# Patient Record
Sex: MALE | Race: ASIAN | Employment: FULL TIME | URBAN - METROPOLITAN AREA
[De-identification: names, ages, dates, MRNs, and addresses within clinical notes are randomized per-mention and may not be internally consistent; named-entity substitution may affect disease eponyms.]

---

## 2017-01-13 ENCOUNTER — CLINICAL SUPPORT (OUTPATIENT)
Dept: CARDIOLOGY | Facility: CLINIC | Age: 37
End: 2017-01-13
Payer: COMMERCIAL

## 2017-01-13 ENCOUNTER — OFFICE VISIT (OUTPATIENT)
Dept: INTERNAL MEDICINE | Facility: CLINIC | Age: 37
End: 2017-01-13
Payer: COMMERCIAL

## 2017-01-13 ENCOUNTER — CLINICAL SUPPORT (OUTPATIENT)
Dept: REHABILITATION | Facility: HOSPITAL | Age: 37
End: 2017-01-13
Attending: FAMILY MEDICINE
Payer: COMMERCIAL

## 2017-01-13 ENCOUNTER — CLINICAL SUPPORT (OUTPATIENT)
Dept: INTERNAL MEDICINE | Facility: CLINIC | Age: 37
End: 2017-01-13
Payer: COMMERCIAL

## 2017-01-13 VITALS
RESPIRATION RATE: 16 BRPM | BODY MASS INDEX: 24.87 KG/M2 | WEIGHT: 173.75 LBS | HEART RATE: 64 BPM | DIASTOLIC BLOOD PRESSURE: 54 MMHG | SYSTOLIC BLOOD PRESSURE: 100 MMHG | TEMPERATURE: 97 F | HEIGHT: 70 IN

## 2017-01-13 VITALS
DIASTOLIC BLOOD PRESSURE: 54 MMHG | SYSTOLIC BLOOD PRESSURE: 100 MMHG | WEIGHT: 173.75 LBS | BODY MASS INDEX: 24.87 KG/M2 | HEIGHT: 70 IN | HEART RATE: 64 BPM | RESPIRATION RATE: 16 BRPM

## 2017-01-13 DIAGNOSIS — D17.1 LIPOMA OF TORSO: ICD-10-CM

## 2017-01-13 DIAGNOSIS — Z00.00 ROUTINE GENERAL MEDICAL EXAMINATION AT A HEALTH CARE FACILITY: Primary | ICD-10-CM

## 2017-01-13 DIAGNOSIS — E78.6 LOW HDL (UNDER 40): ICD-10-CM

## 2017-01-13 DIAGNOSIS — Z00.00 ROUTINE GENERAL MEDICAL EXAMINATION AT A HEALTH CARE FACILITY: ICD-10-CM

## 2017-01-13 DIAGNOSIS — D64.89 ANEMIA DUE TO OTHER CAUSE, NOT CLASSIFIED: ICD-10-CM

## 2017-01-13 DIAGNOSIS — J30.2 SEASONAL ALLERGIC RHINITIS, UNSPECIFIED ALLERGIC RHINITIS TRIGGER: ICD-10-CM

## 2017-01-13 DIAGNOSIS — Z00.00 ANNUAL PHYSICAL EXAM: Primary | ICD-10-CM

## 2017-01-13 DIAGNOSIS — J45.20 MILD INTERMITTENT ASTHMA WITHOUT COMPLICATION: ICD-10-CM

## 2017-01-13 LAB
ALBUMIN SERPL BCP-MCNC: 3.8 G/DL
ALP SERPL-CCNC: 51 U/L
ALT SERPL W/O P-5'-P-CCNC: 19 U/L
ANION GAP SERPL CALC-SCNC: 10 MMOL/L
AST SERPL-CCNC: 16 U/L
BILIRUB SERPL-MCNC: 0.7 MG/DL
BUN SERPL-MCNC: 15 MG/DL
CALCIUM SERPL-MCNC: 9 MG/DL
CHLORIDE SERPL-SCNC: 108 MMOL/L
CHOLEST/HDLC SERPL: 6.5 {RATIO}
CO2 SERPL-SCNC: 22 MMOL/L
CREAT SERPL-MCNC: 0.9 MG/DL
ERYTHROCYTE [DISTWIDTH] IN BLOOD BY AUTOMATED COUNT: 12.3 %
EST. GFR  (AFRICAN AMERICAN): >60 ML/MIN/1.73 M^2
EST. GFR  (NON AFRICAN AMERICAN): >60 ML/MIN/1.73 M^2
GLUCOSE SERPL-MCNC: 92 MG/DL
HCT VFR BLD AUTO: 31.1 %
HDL/CHOLESTEROL RATIO: 15.5 %
HDLC SERPL-MCNC: 181 MG/DL
HDLC SERPL-MCNC: 28 MG/DL
HGB BLD-MCNC: 11 G/DL
LDLC SERPL CALC-MCNC: 115.8 MG/DL
MCH RBC QN AUTO: 30.6 PG
MCHC RBC AUTO-ENTMCNC: 35.4 %
MCV RBC AUTO: 86 FL
NONHDLC SERPL-MCNC: 153 MG/DL
PLATELET # BLD AUTO: 176 K/UL
PMV BLD AUTO: 11 FL
POTASSIUM SERPL-SCNC: 3.9 MMOL/L
PROT SERPL-MCNC: 6.7 G/DL
RBC # BLD AUTO: 3.6 M/UL
SODIUM SERPL-SCNC: 140 MMOL/L
TRIGL SERPL-MCNC: 186 MG/DL
WBC # BLD AUTO: 4.67 K/UL

## 2017-01-13 PROCEDURE — 93005 ELECTROCARDIOGRAM TRACING: CPT | Mod: PBBFAC,PO | Performed by: INTERNAL MEDICINE

## 2017-01-13 PROCEDURE — 99999 PR PBB SHADOW E&M-EST. PATIENT-LVL III: CPT | Mod: PBBFAC,,, | Performed by: FAMILY MEDICINE

## 2017-01-13 PROCEDURE — 85027 COMPLETE CBC AUTOMATED: CPT | Mod: PO

## 2017-01-13 PROCEDURE — 93010 ELECTROCARDIOGRAM REPORT: CPT | Mod: S$PBB,,, | Performed by: INTERNAL MEDICINE

## 2017-01-13 PROCEDURE — 80061 LIPID PANEL: CPT | Mod: PO

## 2017-01-13 PROCEDURE — 80053 COMPREHEN METABOLIC PANEL: CPT | Mod: PO

## 2017-01-13 PROCEDURE — 83036 HEMOGLOBIN GLYCOSYLATED A1C: CPT

## 2017-01-13 PROCEDURE — 97750 PHYSICAL PERFORMANCE TEST: CPT | Performed by: PHYSICAL THERAPIST

## 2017-01-13 PROCEDURE — 99395 PREV VISIT EST AGE 18-39: CPT | Mod: S$PBB,,, | Performed by: FAMILY MEDICINE

## 2017-01-13 NOTE — PROGRESS NOTES
"Subjective:   Patient ID: Maris Bustamante is a 36 y.o. male.  Chief Complaint:  Executive Health    HPI Comments: Executive health evaluation #2.  Medical history ALLERGIC rhinitis and mild intermittent asthma  Surgical history for septoplasty and bilateral LASIK  Medications are Nasonex 2 sprays each nostril daily as needed and albuterol inhaler 2 puffs every 6 hours as needed.  Also on sublingual immunotherapy.  ALLERGIC to a dry products, NO KNOWN DRUG ALLERGIES.  Family history with father hyperlipidemia, recent MI, hearing loss.  Mother healthy.  No other significant family history.  Social history .  [employer] employee.  .  2 healthy boys.  No tobacco or illicit drugs.  Social alcohol use.  Routine health maintenance shows tetanus booster 2009 and flu vaccine 2016, both up-to-date.  Today concerned about nontender lumps/masses under skin left elbow and lower back.    Review of Systems   Constitutional: Negative for chills, fatigue and fever.   HENT: Negative for congestion, ear pain, postnasal drip, sinus pressure and sore throat.    Eyes: Negative for visual disturbance.   Respiratory: Negative for cough, chest tightness, shortness of breath and wheezing.    Cardiovascular: Negative for chest pain, palpitations and leg swelling.   Gastrointestinal: Negative for abdominal pain, blood in stool, constipation, diarrhea, nausea and vomiting.   Endocrine: Negative for polydipsia, polyphagia and polyuria.   Genitourinary: Negative for difficulty urinating, dysuria, flank pain, frequency, hematuria and urgency.   Musculoskeletal: Negative for myalgias.   Skin: Negative for rash.   Neurological: Negative for dizziness, weakness, light-headedness and headaches.   Hematological: Negative for adenopathy.   Psychiatric/Behavioral: Negative.      Objective:     Visit Vitals    BP (!) 100/54    Pulse 64    Temp 97 °F (36.1 °C) (Tympanic)    Resp 16    Ht 5' 10" (1.778 m)    Wt 78.8 kg (173 lb 11.6 oz) "    BMI 24.93 kg/m2     Physical Exam   Constitutional: He is oriented to person, place, and time. Vital signs are normal. He appears well-developed and well-nourished.   HENT:   Right Ear: Hearing, tympanic membrane, external ear and ear canal normal.   Left Ear: Hearing, tympanic membrane, external ear and ear canal normal.   Nose: Nose normal. Right sinus exhibits no maxillary sinus tenderness and no frontal sinus tenderness. Left sinus exhibits no maxillary sinus tenderness and no frontal sinus tenderness.   Mouth/Throat: Uvula is midline, oropharynx is clear and moist and mucous membranes are normal.   Eyes: Conjunctivae are normal. Right eye exhibits no discharge. Left eye exhibits no discharge. Right conjunctiva is not injected. Left conjunctiva is not injected. No scleral icterus.   Neck: No JVD present. No thyroid mass and no thyromegaly present.   Cardiovascular: Normal rate, regular rhythm, normal heart sounds and intact distal pulses.  Exam reveals no gallop and no friction rub.    No murmur heard.  Pulses:       Radial pulses are 2+ on the right side, and 2+ on the left side.   Pulmonary/Chest: Effort normal and breath sounds normal. He has no wheezes. He has no rhonchi. He has no rales.   Abdominal: Soft. He exhibits no distension. There is no tenderness. There is no rebound, no guarding and no CVA tenderness.   Musculoskeletal: He exhibits no edema.   Lymphadenopathy:     He has no cervical adenopathy.   Neurological: He is alert and oriented to person, place, and time.   Skin: Skin is warm and dry. No rash noted.   Psychiatric: He has a normal mood and affect.   Nursing note and vitals reviewed.    Assessment:     1. Annual physical exam    2. Anemia due to other cause, not classified    3. Low HDL (under 40)    4. Lipoma of torso    5. Seasonal allergic rhinitis, unspecified allergic rhinitis trigger    6. Mild intermittent asthma without complication      Plan:   Annual physical exam  Send a full  executive health letter and summary when all lab results are available    Anemia due to other cause, not classified  Donated blood 2 days ago.  Likely cause.  Previously normal.  Advised recheck CBC in 6 weeks.    Low HDL (under 40)  Discussed ways to increase HDL.  Remainder lipid panel acceptable.  10 year risk 1.1%.    Lipoma of torso  Discussed benign nature of lipomas.  No treatment indicated unless more symptomatic.    RTC 1 year for repeat executive health evaluation

## 2017-01-13 NOTE — PROGRESS NOTES
:  [birth year]    DX: v70.0  Orders:  Fitness Evaluation    Patient returns for his 3rd Executive [employer] Fitness Component evaluation was completed.  Results are as follows:    Height (in):    70                            Weight (lbs):    173                                    BMI:   24.9    Resting Energy Expenditure:         1550       kcal/24 hrs.              Estimated:    1715     REE measured post treadmill:         No   REE measured fasting:       Yes         Body Composition:          18.91  % body fat             Rating: Very Good    Waist to Hip Ratio:     0.87                    Risk:  Low   Hip taken over clothing:      Yes          Muscular Strength and Endurance Assessment:               Strength (lbs):     Right: 78.7             Rating:  Below Average      Left:  86.7           Rating: Below Average   Push-ups:   30                 Rating:  Excellent   Curl-ups :   25                Rating:  Below Average    Flexibility Testing:   Sit and Reach (cm):    10.5                       Rating:  Needs Improvement    The patient tolerated the testing procedures well.

## 2017-01-14 LAB
ESTIMATED AVG GLUCOSE: 97 MG/DL
HBA1C MFR BLD HPLC: 5 %

## 2018-01-12 ENCOUNTER — OFFICE VISIT (OUTPATIENT)
Dept: INTERNAL MEDICINE | Facility: CLINIC | Age: 38
End: 2018-01-12
Payer: COMMERCIAL

## 2018-01-12 ENCOUNTER — CLINICAL SUPPORT (OUTPATIENT)
Dept: INTERNAL MEDICINE | Facility: CLINIC | Age: 38
End: 2018-01-12
Payer: COMMERCIAL

## 2018-01-12 VITALS
BODY MASS INDEX: 24.61 KG/M2 | DIASTOLIC BLOOD PRESSURE: 74 MMHG | RESPIRATION RATE: 16 BRPM | TEMPERATURE: 97 F | WEIGHT: 171.94 LBS | SYSTOLIC BLOOD PRESSURE: 118 MMHG | HEART RATE: 68 BPM | HEIGHT: 70 IN

## 2018-01-12 VITALS
RESPIRATION RATE: 16 BRPM | HEART RATE: 68 BPM | WEIGHT: 171.94 LBS | BODY MASS INDEX: 24.61 KG/M2 | DIASTOLIC BLOOD PRESSURE: 74 MMHG | SYSTOLIC BLOOD PRESSURE: 118 MMHG | HEIGHT: 70 IN

## 2018-01-12 DIAGNOSIS — J30.2 SEASONAL ALLERGIC RHINITIS, UNSPECIFIED CHRONICITY, UNSPECIFIED TRIGGER: ICD-10-CM

## 2018-01-12 DIAGNOSIS — Z00.00 ROUTINE GENERAL MEDICAL EXAMINATION AT A HEALTH CARE FACILITY: Primary | ICD-10-CM

## 2018-01-12 DIAGNOSIS — J45.20 MILD INTERMITTENT ASTHMA WITHOUT COMPLICATION: ICD-10-CM

## 2018-01-12 LAB
ALBUMIN SERPL BCP-MCNC: 4.2 G/DL
ALP SERPL-CCNC: 61 U/L
ALT SERPL W/O P-5'-P-CCNC: 30 U/L
ANION GAP SERPL CALC-SCNC: 7 MMOL/L
AST SERPL-CCNC: 21 U/L
BILIRUB SERPL-MCNC: 0.8 MG/DL
BUN SERPL-MCNC: 12 MG/DL
CALCIUM SERPL-MCNC: 9.6 MG/DL
CHLORIDE SERPL-SCNC: 107 MMOL/L
CHOLEST SERPL-MCNC: 244 MG/DL
CHOLEST/HDLC SERPL: 6.1 {RATIO}
CO2 SERPL-SCNC: 26 MMOL/L
CREAT SERPL-MCNC: 0.9 MG/DL
ERYTHROCYTE [DISTWIDTH] IN BLOOD BY AUTOMATED COUNT: 11.8 %
EST. GFR  (AFRICAN AMERICAN): >60 ML/MIN/1.73 M^2
EST. GFR  (NON AFRICAN AMERICAN): >60 ML/MIN/1.73 M^2
ESTIMATED AVG GLUCOSE: 97 MG/DL
GLUCOSE SERPL-MCNC: 95 MG/DL
HBA1C MFR BLD HPLC: 5 %
HCT VFR BLD AUTO: 41.5 %
HDLC SERPL-MCNC: 40 MG/DL
HDLC SERPL: 16.4 %
HGB BLD-MCNC: 14.4 G/DL
LDLC SERPL CALC-MCNC: 163.4 MG/DL
MCH RBC QN AUTO: 29.7 PG
MCHC RBC AUTO-ENTMCNC: 34.7 G/DL
MCV RBC AUTO: 86 FL
NONHDLC SERPL-MCNC: 204 MG/DL
PLATELET # BLD AUTO: 178 K/UL
PMV BLD AUTO: 11.6 FL
POTASSIUM SERPL-SCNC: 4 MMOL/L
PROT SERPL-MCNC: 7.8 G/DL
RBC # BLD AUTO: 4.85 M/UL
SODIUM SERPL-SCNC: 140 MMOL/L
TRIGL SERPL-MCNC: 203 MG/DL
WBC # BLD AUTO: 5.33 K/UL

## 2018-01-12 PROCEDURE — 80061 LIPID PANEL: CPT | Mod: PO

## 2018-01-12 PROCEDURE — 80053 COMPREHEN METABOLIC PANEL: CPT | Mod: PO

## 2018-01-12 PROCEDURE — 99395 PREV VISIT EST AGE 18-39: CPT | Mod: S$GLB,,, | Performed by: FAMILY MEDICINE

## 2018-01-12 PROCEDURE — 85027 COMPLETE CBC AUTOMATED: CPT | Mod: PO

## 2018-01-12 PROCEDURE — 99999 PR PBB SHADOW E&M-EST. PATIENT-LVL III: CPT | Mod: PBBFAC,,, | Performed by: FAMILY MEDICINE

## 2018-01-12 PROCEDURE — 83036 HEMOGLOBIN GLYCOSYLATED A1C: CPT

## 2018-01-12 NOTE — PROGRESS NOTES
Subjective:   Patient ID: Maris Bustamante is a 37 y.o. male.  Chief Complaint:  Executive Health      Presents for executive health evaluation #3.  No PCP.  Sees an allergist and orthopedist, unclear of name.  Past medical history significant for ALLERGIC rhinitis, mild intermittent asthma, and lipomas.  Past surgical history for septoplasty and bilateral Lasik  Regular medications include Nasonex 2 puffs each nostril daily, albuterol 2 puffs as needed  Social history employed by [employer].  .  2 healthy boys.  Occasional alcohol use, no tobacco or illicit drug use.  Family history father, alive, with hyperlipidemia, cardiac artery disease, myocardial infarction, hearing loss. Mother, alive and healthy.  No known colon or prostate cancer.  Routine health maintenance with tetanus booster 2009, flu vaccine October 2017.    Complains ifFlatulence and belching, recent nosebleeds, halitosis, and right shoulder pain.        Current Outpatient Prescriptions:     albuterol (PROAIR HFA) 90 mcg/actuation inhaler, Inhale 1 puff into the lungs daily as needed for Wheezing., Disp: , Rfl:     mometasone (NASONEX) 50 mcg/actuation nasal spray, 2 sprays by Nasal route as needed., Disp: , Rfl:      Review of Systems   Constitutional: Negative for chills, fatigue and fever.   HENT: Positive for nosebleeds. Negative for congestion, ear pain, postnasal drip, sinus pressure and sore throat.    Eyes: Negative for visual disturbance.   Respiratory: Negative for cough, chest tightness, shortness of breath and wheezing.    Cardiovascular: Negative for chest pain, palpitations and leg swelling.   Gastrointestinal: Negative for abdominal distention, abdominal pain, blood in stool, constipation, diarrhea, nausea and vomiting.   Endocrine: Negative for polydipsia, polyphagia and polyuria.   Genitourinary: Negative for difficulty urinating, dysuria, flank pain, frequency, hematuria and urgency.   Musculoskeletal: Positive for  "arthralgias. Negative for back pain, gait problem, joint swelling, myalgias, neck pain and neck stiffness.   Skin: Negative for rash.   Neurological: Negative for dizziness, weakness, light-headedness and headaches.   Hematological: Negative for adenopathy.   Psychiatric/Behavioral: Negative for sleep disturbance. The patient is not nervous/anxious.      Objective:   /74   Pulse 68   Temp 96.8 °F (36 °C) (Tympanic)   Resp 16   Ht 5' 10" (1.778 m)   Wt 78 kg (171 lb 15.3 oz)   BMI 24.67 kg/m²     Physical Exam   Constitutional: He is oriented to person, place, and time. Vital signs are normal. He appears well-developed and well-nourished.   HENT:   Right Ear: Hearing, tympanic membrane, external ear and ear canal normal.   Left Ear: Hearing, tympanic membrane, external ear and ear canal normal.   Nose: Mucosal edema and rhinorrhea present. No nose lacerations, sinus tenderness, nasal deformity, septal deviation or nasal septal hematoma. No epistaxis.  No foreign bodies. Right sinus exhibits no maxillary sinus tenderness and no frontal sinus tenderness. Left sinus exhibits no maxillary sinus tenderness and no frontal sinus tenderness.   Mouth/Throat: Uvula is midline, oropharynx is clear and moist and mucous membranes are normal.   Eyes: Conjunctivae are normal. Right eye exhibits no discharge. Left eye exhibits no discharge. Right conjunctiva is not injected. Left conjunctiva is not injected. No scleral icterus.   Neck: No JVD present. No thyroid mass and no thyromegaly present.   Cardiovascular: Normal rate, regular rhythm, normal heart sounds and intact distal pulses.  Exam reveals no gallop and no friction rub.    No murmur heard.  Pulses:       Radial pulses are 2+ on the right side, and 2+ on the left side.   Pulmonary/Chest: Effort normal and breath sounds normal. He has no wheezes. He has no rhonchi. He has no rales.   Abdominal: Soft. He exhibits no distension. There is no tenderness. There is no " rebound, no guarding and no CVA tenderness.   Musculoskeletal: He exhibits no edema.   Lymphadenopathy:     He has no cervical adenopathy.   Neurological: He is alert and oriented to person, place, and time.   Skin: Skin is warm and dry. No rash noted.   Psychiatric: He has a normal mood and affect.   Nursing note and vitals reviewed.    CBC with white blood cells 5.3, hemoglobin/hematocrit 14.4/41.5, platelet 178.  BMP with Sodium 140, potassium 4, chloride 107, CO2 26, BUNs/creatinine 12/0.9, glucose 95.  LFTs normal.  Lipid panel with total cholesterol 244, triglycerides 23, HDL 40, .  10 year risk 10%.  A1c pending.    Assessment:     1. Routine general medical examination at a health care facility    2. Seasonal allergic rhinitis, unspecified chronicity, unspecified trigger    3. Mild intermittent asthma without complication      Plan:   Routine general medical examination at a health care facility  Send a full executive health letter and summary when all lab results are available  Previous anemia resolved.  Lipid panel elevated, 10 year risk low, low-fat low-cholesterol diet.    Seasonal allergic rhinitis, unspecified chronicity, unspecified trigger  Mild intermittent asthma without complication  Stable.  Continue per allergist.    Return to clinic 1 year for Executive health evaluation #4.

## 2018-10-12 DIAGNOSIS — Z00.00 ROUTINE GENERAL MEDICAL EXAMINATION AT A HEALTH CARE FACILITY: Primary | ICD-10-CM

## 2019-01-25 ENCOUNTER — NUTRITION (OUTPATIENT)
Dept: NUTRITION | Facility: CLINIC | Age: 39
End: 2019-01-25
Payer: COMMERCIAL

## 2019-01-25 ENCOUNTER — CLINICAL SUPPORT (OUTPATIENT)
Dept: CARDIOLOGY | Facility: CLINIC | Age: 39
End: 2019-01-25
Attending: FAMILY MEDICINE
Payer: COMMERCIAL

## 2019-01-25 ENCOUNTER — CLINICAL SUPPORT (OUTPATIENT)
Dept: CARDIOLOGY | Facility: CLINIC | Age: 39
End: 2019-01-25
Payer: COMMERCIAL

## 2019-01-25 ENCOUNTER — CLINICAL SUPPORT (OUTPATIENT)
Dept: INTERNAL MEDICINE | Facility: CLINIC | Age: 39
End: 2019-01-25
Payer: COMMERCIAL

## 2019-01-25 ENCOUNTER — OFFICE VISIT (OUTPATIENT)
Dept: INTERNAL MEDICINE | Facility: CLINIC | Age: 39
End: 2019-01-25
Payer: COMMERCIAL

## 2019-01-25 ENCOUNTER — CLINICAL SUPPORT (OUTPATIENT)
Dept: REHABILITATION | Facility: HOSPITAL | Age: 39
End: 2019-01-25
Payer: COMMERCIAL

## 2019-01-25 VITALS
SYSTOLIC BLOOD PRESSURE: 102 MMHG | WEIGHT: 171.94 LBS | RESPIRATION RATE: 14 BRPM | DIASTOLIC BLOOD PRESSURE: 70 MMHG | HEIGHT: 70 IN | BODY MASS INDEX: 24.61 KG/M2 | HEART RATE: 68 BPM

## 2019-01-25 VITALS
HEIGHT: 70 IN | SYSTOLIC BLOOD PRESSURE: 102 MMHG | WEIGHT: 171.94 LBS | BODY MASS INDEX: 24.61 KG/M2 | TEMPERATURE: 98 F | HEART RATE: 68 BPM | DIASTOLIC BLOOD PRESSURE: 70 MMHG

## 2019-01-25 DIAGNOSIS — Z23 NEED FOR TDAP VACCINATION: ICD-10-CM

## 2019-01-25 DIAGNOSIS — J45.20 MILD INTERMITTENT ASTHMA WITHOUT COMPLICATION: ICD-10-CM

## 2019-01-25 DIAGNOSIS — Z00.00 ROUTINE GENERAL MEDICAL EXAMINATION AT A HEALTH CARE FACILITY: ICD-10-CM

## 2019-01-25 DIAGNOSIS — Z00.00 ROUTINE GENERAL MEDICAL EXAMINATION AT A HEALTH CARE FACILITY: Primary | ICD-10-CM

## 2019-01-25 DIAGNOSIS — E78.2 MIXED HYPERLIPIDEMIA: ICD-10-CM

## 2019-01-25 DIAGNOSIS — J30.2 SEASONAL ALLERGIC RHINITIS, UNSPECIFIED TRIGGER: ICD-10-CM

## 2019-01-25 LAB
ALBUMIN SERPL BCP-MCNC: 4 G/DL
ALP SERPL-CCNC: 67 U/L
ALT SERPL W/O P-5'-P-CCNC: 29 U/L
ANION GAP SERPL CALC-SCNC: 11 MMOL/L
AST SERPL-CCNC: 18 U/L
BILIRUB SERPL-MCNC: 0.8 MG/DL
BUN SERPL-MCNC: 15 MG/DL
CALCIUM SERPL-MCNC: 9.3 MG/DL
CHLORIDE SERPL-SCNC: 105 MMOL/L
CHOLEST SERPL-MCNC: 222 MG/DL
CHOLEST/HDLC SERPL: 5.2 {RATIO}
CO2 SERPL-SCNC: 25 MMOL/L
COMPLEXED PSA SERPL-MCNC: 0.46 NG/ML
CREAT SERPL-MCNC: 1 MG/DL
ERYTHROCYTE [DISTWIDTH] IN BLOOD BY AUTOMATED COUNT: 11.9 %
EST. GFR  (AFRICAN AMERICAN): >60 ML/MIN/1.73 M^2
EST. GFR  (NON AFRICAN AMERICAN): >60 ML/MIN/1.73 M^2
ESTIMATED AVG GLUCOSE: 103 MG/DL
GLUCOSE SERPL-MCNC: 93 MG/DL
HBA1C MFR BLD HPLC: 5.2 %
HCT VFR BLD AUTO: 41.4 %
HDLC SERPL-MCNC: 43 MG/DL
HDLC SERPL: 19.4 %
HGB BLD-MCNC: 14.3 G/DL
LDLC SERPL CALC-MCNC: 153.4 MG/DL
MCH RBC QN AUTO: 29.5 PG
MCHC RBC AUTO-ENTMCNC: 34.5 G/DL
MCV RBC AUTO: 86 FL
NONHDLC SERPL-MCNC: 179 MG/DL
PLATELET # BLD AUTO: 184 K/UL
PMV BLD AUTO: 11.4 FL
POTASSIUM SERPL-SCNC: 4.2 MMOL/L
PROT SERPL-MCNC: 7.4 G/DL
RBC # BLD AUTO: 4.84 M/UL
SODIUM SERPL-SCNC: 141 MMOL/L
TRIGL SERPL-MCNC: 128 MG/DL
TSH SERPL DL<=0.005 MIU/L-ACNC: 1.51 UIU/ML
WBC # BLD AUTO: 5.41 K/UL

## 2019-01-25 PROCEDURE — 99386 PR PREVENTIVE VISIT,NEW,40-64: ICD-10-PCS | Mod: 25,S$GLB,, | Performed by: FAMILY MEDICINE

## 2019-01-25 PROCEDURE — 97750 PHYSICAL PERFORMANCE TEST: CPT | Performed by: PHYSICAL THERAPIST

## 2019-01-25 PROCEDURE — 93000 ELECTROCARDIOGRAM COMPLETE: CPT | Mod: S$GLB,,, | Performed by: INTERNAL MEDICINE

## 2019-01-25 PROCEDURE — 84153 ASSAY OF PSA TOTAL: CPT

## 2019-01-25 PROCEDURE — 99386 PREV VISIT NEW AGE 40-64: CPT | Mod: 25,S$GLB,, | Performed by: FAMILY MEDICINE

## 2019-01-25 PROCEDURE — 99999 PR PBB SHADOW E&M-EST. PATIENT-LVL III: ICD-10-PCS | Mod: PBBFAC,,, | Performed by: FAMILY MEDICINE

## 2019-01-25 PROCEDURE — 90471 IMMUNIZATION ADMIN: CPT | Mod: S$GLB,,, | Performed by: FAMILY MEDICINE

## 2019-01-25 PROCEDURE — 99999 PR PBB SHADOW E&M-EST. PATIENT-LVL III: CPT | Mod: PBBFAC,,, | Performed by: FAMILY MEDICINE

## 2019-01-25 PROCEDURE — 80053 COMPREHEN METABOLIC PANEL: CPT

## 2019-01-25 PROCEDURE — 99999 PR PBB SHADOW E&M-EST. PATIENT-LVL I: ICD-10-PCS | Mod: PBBFAC,,,

## 2019-01-25 PROCEDURE — 97802 PR MED NUTR THER, 1ST, INDIV, EA 15 MIN: ICD-10-PCS | Mod: S$GLB,,,

## 2019-01-25 PROCEDURE — 93015 CARDIAC TREADMILL STRESS TEST: ICD-10-PCS | Mod: S$GLB,,, | Performed by: INTERNAL MEDICINE

## 2019-01-25 PROCEDURE — 80061 LIPID PANEL: CPT

## 2019-01-25 PROCEDURE — 83036 HEMOGLOBIN GLYCOSYLATED A1C: CPT

## 2019-01-25 PROCEDURE — 90715 TDAP VACCINE GREATER THAN OR EQUAL TO 7YO IM: ICD-10-PCS | Mod: S$GLB,,, | Performed by: FAMILY MEDICINE

## 2019-01-25 PROCEDURE — 93000 EKG 12-LEAD: ICD-10-PCS | Mod: S$GLB,,, | Performed by: INTERNAL MEDICINE

## 2019-01-25 PROCEDURE — 90715 TDAP VACCINE 7 YRS/> IM: CPT | Mod: S$GLB,,, | Performed by: FAMILY MEDICINE

## 2019-01-25 PROCEDURE — 97802 MEDICAL NUTRITION INDIV IN: CPT | Mod: S$GLB,,,

## 2019-01-25 PROCEDURE — 84443 ASSAY THYROID STIM HORMONE: CPT

## 2019-01-25 PROCEDURE — 85027 COMPLETE CBC AUTOMATED: CPT

## 2019-01-25 PROCEDURE — 99999 PR PBB SHADOW E&M-EST. PATIENT-LVL I: CPT | Mod: PBBFAC,,,

## 2019-01-25 PROCEDURE — 90471 TDAP VACCINE GREATER THAN OR EQUAL TO 7YO IM: ICD-10-PCS | Mod: S$GLB,,, | Performed by: FAMILY MEDICINE

## 2019-01-25 PROCEDURE — 93015 CV STRESS TEST SUPVJ I&R: CPT | Mod: S$GLB,,, | Performed by: INTERNAL MEDICINE

## 2019-01-25 RX ORDER — SIMVASTATIN 40 MG/1
40 TABLET, FILM COATED ORAL
COMMUNITY
Start: 2019-01-24

## 2019-01-25 NOTE — PROGRESS NOTES
Subjective:   Patient ID: Maris Bustamante is a 38 y.o. male.  Chief Complaint:  Executive Health      Executive health evaluation 4.   PCP Dr. Wiliam hendrix.  Orthopedist Dr. Donovan  Past medical history for mixed hyperlipidemia, allergic rhino sinusitis, mild intermittent asthma, lipomas.    Past surgical history for rotator cuff repair, septoplasty, and Lasix.    Regular medications include simvastatin 40 mg daily, Nasonex 2 puffs daily, albuterol as needed   No known drug allergies.    Social history includes appointment by [employer].  .  Two kids.  No tobacco or illicit drug use.  Social alcohol use.  Family history includes father, alive, hypertension, heart disease, coronary artery disease, hyperlipidemia, history MI, hearing loss.  Mother, alive, healthy.  No known colon or prostate cancer.    Routine health maintenance with last tetanus booster 2009 an influenza vaccine October 2018.    No new complaints concerns today.      Review of Systems   Constitutional: Negative for chills, fatigue and fever.   HENT: Negative for congestion, ear pain, postnasal drip, rhinorrhea, sinus pressure and sore throat.    Eyes: Negative for visual disturbance.   Respiratory: Negative for cough, chest tightness, shortness of breath and wheezing.    Cardiovascular: Negative for chest pain, palpitations and leg swelling.   Gastrointestinal: Negative for abdominal pain, constipation, diarrhea, nausea and vomiting.   Endocrine: Negative for polydipsia, polyphagia and polyuria.   Genitourinary: Negative for difficulty urinating, dysuria, flank pain, frequency, hematuria and urgency.   Musculoskeletal: Negative for myalgias.   Skin: Negative for rash.   Neurological: Negative for dizziness, weakness, light-headedness and headaches.   Hematological: Negative for adenopathy.   Psychiatric/Behavioral: Negative for sleep disturbance. The patient is not nervous/anxious.      Objective:   /70 (BP Location: Right arm, Patient  "Position: Sitting, BP Method: Medium (Manual))   Pulse 68   Temp 97.8 °F (36.6 °C) (Oral)   Ht 5' 10" (1.778 m)   Wt 78 kg (171 lb 15.3 oz)   BMI 24.67 kg/m²     Physical Exam   Constitutional: He is oriented to person, place, and time. Vital signs are normal. He appears well-developed and well-nourished. No distress.   HENT:   Right Ear: Hearing, tympanic membrane, external ear and ear canal normal.   Left Ear: Hearing, tympanic membrane, external ear and ear canal normal.   Nose: Nose normal. Right sinus exhibits no maxillary sinus tenderness and no frontal sinus tenderness. Left sinus exhibits no maxillary sinus tenderness and no frontal sinus tenderness.   Mouth/Throat: Uvula is midline, oropharynx is clear and moist and mucous membranes are normal.   Eyes: Conjunctivae are normal. Right eye exhibits no discharge. Left eye exhibits no discharge. Right conjunctiva is not injected. Left conjunctiva is not injected. No scleral icterus.   Neck: No JVD present. No thyroid mass and no thyromegaly present.   Cardiovascular: Normal rate, regular rhythm, normal heart sounds and intact distal pulses. Exam reveals no gallop and no friction rub.   No murmur heard.  Pulses:       Radial pulses are 2+ on the right side, and 2+ on the left side.   Pulmonary/Chest: Effort normal and breath sounds normal. He has no wheezes. He has no rhonchi. He has no rales.   Abdominal: Soft. He exhibits no distension. There is no tenderness. There is no rebound, no guarding and no CVA tenderness.   Musculoskeletal: He exhibits no edema.   Lymphadenopathy:     He has no cervical adenopathy.   Neurological: He is alert and oriented to person, place, and time.   Skin: Skin is warm and dry. No rash noted.   Psychiatric: He has a normal mood and affect.   Nursing note and vitals reviewed.    CBC with normal white cell count, red cell count and platelet levels.    CMP with normal glucose, kidney, liver, electrolytes.    Lipid panel total " cholesterol 222, triglycerides 128, HDL 43, .  Ten year risk less than 1%.  On statin.    A1c, TSH, PSA pending.      EKG normal sinus rhythm, normal EKG.  Stress test pending.    Assessment:       ICD-10-CM ICD-9-CM   1. Routine general medical examination at a health care facility Z00.00 V70.0   2. Need for Tdap vaccination Z23 V06.1   3. Mixed hyperlipidemia E78.2 272.2   4. Seasonal allergic rhinitis, unspecified trigger J30.2 477.9   5. Mild intermittent asthma without complication J45.20 493.90     Plan:   Routine general medical examination at a health care facility  Need for Tdap vaccination  -     (In Office Administered) Tdap Vaccine  Blood pressure normal.  BMI 25. Physical exam unremarkable.    Full executive Health letter when all test resulted.    Tetanus booster today.    Flu vaccine up-to-date.      Mixed hyperlipidemia  Low overall 10 year risk.    Lipid panel stable on present simvastatin 40 mg daily.      Seasonal allergic rhinitis, unspecified trigger  Mild intermittent asthma without complication  Asymptomatic.  No frequent exacerbations.    Continue prescribed medications.    Return to clinic 1 year executive health evaluation 5

## 2019-01-25 NOTE — PROGRESS NOTES
:  [birth year]    DX: Z00.0  Orders:  Fitness Evaluation    Patient's 3rd [facility] Fitness Component evaluation was completed.  Results are as follows:    Height (in):    70                            Weight (lbs):    171                                    BMI:   24.6    Resting Energy Expenditure:         1770       kcal/24 hrs.              Estimated:    1696     REE measured post treadmill:     Yes     ~ 1.25 hours   REE measured fasting:             No   2 granola bars    Body Composition:          19.71  % body fat             Rating: Very Good    Waist to Hip Ratio:     0.97                    Risk:  Low   Hip taken over clothing:      Yes          Muscular Strength and Endurance Assessment:               Strength (lbs):     Right: 83.7             Rating:  Below average      Left:  87.7           Rating: below average   Push-ups:   27                 Rating:  excellent   Curl-ups :   42                Rating:  averagee    Flexibility Testing:   Sit and Reach (cm):    10                       Rating:  Needs Improvement    Patient s/p R RC surgery 2018.  Has been cleared for all activity except lifting overhead.     The patient tolerated the testing procedures well.

## 2019-01-25 NOTE — PROGRESS NOTES
"Nutrition Assessment  Client name:  Maris Bustamante  :  1980  Age:  38 y.o.  Gender:  male    Pt works at [employer] as a . Pt reports making dietary changes previously from past nutrition visits through [employer]. Pt reports that he tries to keep his weight under 175 lbs. Pt weight currently at 171 lbs. Pt reports PMHx of genetically high cholesterol. Pt reports starting statin rx back in 2018, however within the last month he has not been taking due to lack of getting refilled. Pt reports he feels as though his elevated cholesterol is more than likely related to not taking his statin within the last month. Pt provided diet recall with commentary that proved pt knowledge of diet needs. Answered all questions.     Anthropometrics  Height:  70"     Weight:  171.96 lbs  BMI:  24.76  % Body Fat:  19.71 %    Clinical Signs/Symptoms  N/V/D:  Pt denies.  Appetite (Good, Fair, or Poor):  Good      Past Medical History:   Diagnosis Date    Allergic rhinitis, seasonal 2013    Asthma     Lipoma     Mixed hyperlipidemia 2019       Past Surgical History:   Procedure Laterality Date    LASIK      ROTATOR CUFF REPAIR Right     SEPTORHINOPLASTY         Medications    has a current medication list which includes the following prescription(s): albuterol, mometasone, and simvastatin.    Vitamins, Minerals, and/or Supplements:  None per pt.     Food/Medication Interactions:  Reviewed with pt.  Albuterol: take with food, limit caffeine.  Beclomethasone: can decrease sense of taste, rinse mouth after use.  Zocor: caution with grapefruit, avoid alcohol.    Food Allergies or Intolerances:  Eggs per pt - "mild egg allergy", pt reports he gets a scratchy throat post consumption.    Social History    Marital status:    Employment:   at [employer] for 18 years.    Social History     Tobacco Use    Smoking status: Never Smoker   Substance Use Topics    Alcohol " use: Yes     Comment: occasionally        Lab Reports   Total Cholesterol:  222 (H) - decreased from 244 (H) 1 year ago.    Triglycerides:  128 - decreased from 203 (H) 1 year ago.  HDL:  43 - was 40 (WNL) 1 year ago.  LDL:  153.4 - decreased from 163.4 (H) 1 year ago.  Glucose:  93  HbA1c:  5.2  BP:  102/70     Food History  Breakfast:  Oatmeal or cereal (Cheerios or another type of sugary kids cereal per pt) with skim milk  Mid-morning Snack:  Granola bar and banana  Lunch:  Pt reports grabbing food out to eat - chipotle (chicken or chorizo burrito bowl with brown rice, black beans, guzman lettuce, cheese, sour cream, salsa and guacamole) firehouse subs ( italian sub on whole wheat bread) chik rosamaria a (grilled chicken or fried chicken with fries or greek yogurt), pt reports always ordering water with meals.  Dinner:  Varies, cooks at home with family. Meals may consist of grilled chicken or flank steak with gluten free pasta (for wife) and marinara sauce or sometimes will do taco night with ground beef and associated toppings. Pt reports cooking with olive oil only, may use butter sometimes for bread and potatoes (if any).  H.S. Snack:  Yoplait yogurt and granola OR oreos (pt reports only a couple) and sometimes ice cream  *Fluid intake:  Water and coffee only.    Exercise History:  Pt reports walking and playing with kids regularly. Pt reports he will do some weight training once a week. Pt reports also walking on the treadmill once a week. Pt reports he has tendonitis, prevents him from running - will increase the slope instead. Pt reports he and his wife will also do beach body and p 90x videos sometimes at home.     Cultural/Spiritual/Personal Preferences:  None per pt.    Support System:  Family.    State of Change:  Action    Barriers to Change:  None.    Diagnosis    Altered Nutrition-related Lab Values related to organ dysfunction (predisposition) as evidenced by increased serum lipids - high cholesterol @  222.    Intervention    RMR (Method:  Colbert StDwight Bird):  1699 kcal  Activity Factor:  1.3   EMMETT:  2209 kcal    Goals:  1.  Apply plant based diet recommendations discussed where applicable to improve lipid panel (unable to specify when and at what meals during consult due to varied meal schedule)  2.  Continue current exercise regimen      Nutrition Education  Discussed current high cholesterol (222) and how labs have improved since 1 year ago. Encouraged plant based diet to assist with improving lipid panel. Reviewed diet recall and provided recommendations where needed e.g. Add peanut butter to oatmeal or choosing veggie chipotle bowl (beans as protein source with no cholesterol instead of chicken). Discussed portion sizes. Discussed food and medication interactions. Provided recommended calorie intake for weight loss and maintenance. Answered all pt questions.     Patient verbalized understanding of nutrition education and recommendations received.    Handouts Provided  Meal Planning Guide  Eat Fit Shopping List  Fast Food Guide  Vitamin/Mineral Guide    Monitoring/Evaluation    Monitor the following:  Weight  BMI  % Body Fat  Caloric intake  Labs: Cholesterol 222 H     Follow Up Plan:  Communication with referring healthcare provider is unnecessary at this time as patient presented as part of annual wellness exam.  However, will follow up with patient in 1-2 years.

## 2019-01-28 LAB — DIASTOLIC DYSFUNCTION: NO
